# Patient Record
Sex: FEMALE | Race: WHITE | Employment: OTHER | RURAL
[De-identification: names, ages, dates, MRNs, and addresses within clinical notes are randomized per-mention and may not be internally consistent; named-entity substitution may affect disease eponyms.]

---

## 2017-02-13 ENCOUNTER — OFFICE VISIT (OUTPATIENT)
Dept: FAMILY MEDICINE CLINIC | Age: 72
End: 2017-02-13

## 2017-02-13 VITALS
DIASTOLIC BLOOD PRESSURE: 60 MMHG | HEIGHT: 64 IN | BODY MASS INDEX: 31.69 KG/M2 | RESPIRATION RATE: 16 BRPM | SYSTOLIC BLOOD PRESSURE: 138 MMHG | WEIGHT: 185.6 LBS | OXYGEN SATURATION: 100 % | TEMPERATURE: 92.2 F | HEART RATE: 72 BPM

## 2017-02-13 DIAGNOSIS — J06.9 VIRAL UPPER RESPIRATORY TRACT INFECTION: ICD-10-CM

## 2017-02-13 DIAGNOSIS — Z96.653 H/O TOTAL KNEE REPLACEMENT, BILATERAL: ICD-10-CM

## 2017-02-13 DIAGNOSIS — M72.0 DUPUYTREN'S CONTRACTURE OF BOTH HANDS: ICD-10-CM

## 2017-02-13 DIAGNOSIS — K57.30 DIVERTICULOSIS OF LARGE INTESTINE WITHOUT HEMORRHAGE: ICD-10-CM

## 2017-02-13 DIAGNOSIS — M48.061 DEGENERATIVE LUMBAR SPINAL STENOSIS: ICD-10-CM

## 2017-02-13 DIAGNOSIS — K21.9 GASTROESOPHAGEAL REFLUX DISEASE, ESOPHAGITIS PRESENCE NOT SPECIFIED: ICD-10-CM

## 2017-02-13 DIAGNOSIS — N28.9 FUNCTION KIDNEY DECREASED: ICD-10-CM

## 2017-02-13 DIAGNOSIS — I10 ESSENTIAL HYPERTENSION, BENIGN: ICD-10-CM

## 2017-02-13 DIAGNOSIS — Z79.899 LONG TERM USE OF DRUG: ICD-10-CM

## 2017-02-13 DIAGNOSIS — J44.9 CHRONIC OBSTRUCTIVE PULMONARY DISEASE, UNSPECIFIED COPD TYPE (HCC): ICD-10-CM

## 2017-02-13 DIAGNOSIS — R76.8 POSITIVE ANA (ANTINUCLEAR ANTIBODY): ICD-10-CM

## 2017-02-13 DIAGNOSIS — K58.9 IRRITABLE BOWEL SYNDROME, UNSPECIFIED TYPE: ICD-10-CM

## 2017-02-13 DIAGNOSIS — Z92.89 HISTORY OF POSITIVE DOUBLE STRANDED DNA ANTIBODY TEST: ICD-10-CM

## 2017-02-13 DIAGNOSIS — G56.03 CARPAL TUNNEL SYNDROME ON BOTH SIDES: Primary | ICD-10-CM

## 2017-02-13 DIAGNOSIS — M47.816 SPONDYLOSIS OF LUMBAR REGION WITHOUT MYELOPATHY OR RADICULOPATHY: ICD-10-CM

## 2017-02-13 DIAGNOSIS — M79.7 FIBROMYALGIA: ICD-10-CM

## 2017-02-13 RX ORDER — AMITRIPTYLINE HYDROCHLORIDE 25 MG/1
TABLET, FILM COATED ORAL
Qty: 180 TAB | Refills: 3 | Status: SHIPPED | OUTPATIENT
Start: 2017-02-13

## 2017-02-13 RX ORDER — METHYLPREDNISOLONE ACETATE 40 MG/ML
INJECTION, SUSPENSION INTRA-ARTICULAR; INTRALESIONAL; INTRAMUSCULAR; SOFT TISSUE
Qty: 1 ML | Refills: 0
Start: 2017-02-13

## 2017-02-13 RX ORDER — GABAPENTIN 800 MG/1
800 TABLET ORAL 3 TIMES DAILY
Qty: 270 TAB | Refills: 3 | Status: SHIPPED | OUTPATIENT
Start: 2017-02-13

## 2017-02-13 NOTE — PROGRESS NOTES
[address] [facility]  RHEUMATOLOGY  [address] [place], [address] [facility]  Phone [phone] * Fax [phone]    2/13/2017      Chief Complaint   Patient presents with    Follow-up     Care for lumbar spondylosis/stenosis, bilateral carpal tunnel, osteoarthritis, plus an element of fibromyalgia. Recheck DARLENE positivity. REASON FOR VISIT  Emory Severe is a 67 y.o. female who is here for scheduled follow-up for chronic back pain with stenosis and some claudication, bilateral carpal tunnel, right rotator cuff disease, an element of fibromyalgia and monitoring of medications as required. Also for follow-up of a positive DARLENE not thought to be driving her current rheumatic symptoms. She reports stable pain score 5/10 with her back and legs being responsible for the bulk of her discomfort. She has ongoing symptoms of bilateral carpal tunnel and wants the carpal tunnels reinjected today. Symptoms are more prominent on the right hand take longer to clear there when she is symptomatic. She reports that it seems to hurt to make no difference in terms of numbness or CTS symptoms while she is wearing splints or not so she seldom uses them. She has a head cold presently but has had no interval health events and is able to function with her present level of pain. Her kidney function will be rechecked today and I am going to get a follow-up DARLENE profile to check on the status of her positive antinuclear antibody. Renewal prescriptions for gabapentin and amitriptyline will be provided the meloxicam should last her for the rest of the year. She probably will not need to see another rheumatologist if the DARLENE remains noncontributory. Orthopedists will probably need her musculoskeletal needs for the present. 1.          Current Outpatient Prescriptions   Medication Sig Dispense Refill    gabapentin (NEURONTIN) 800 mg tablet Take 1 Tab by mouth three (3) times daily.  270 Tab 3    amitriptyline (ELAVIL) 25 mg tablet Take 2 tabs po hs. 180 Tab 3    meloxicam (MOBIC) 7.5 mg tablet Take 1 Tab by mouth daily. 90 Tab 3    SYMBICORT 160-4.5 mcg/actuation HFA inhaler       COMBIVENT RESPIMAT  mcg/actuation inhaler   1    roflumilast (DALIRESP) tab tablet Take 500 mcg by mouth daily.  albuterol-ipratropium (DUO-NEB) 2.5 mg-0.5 mg/3 ml nebulizer solution 3 mL by Nebulization route once.  spironolactone (ALDACTONE) 25 mg tablet Take  by mouth daily.  ramipril (ALTACE) 10 mg capsule Take 10 mg by mouth daily.  cholecalciferol, vitamin D3, (VITAMIN D3) 2,000 unit tab Take  by mouth.  amLODIPine (NORVASC) 5 mg tablet Take 5 mg by mouth daily.  bumetanide (BUMEX) 1 mg tablet Take  by mouth daily.  montelukast (SINGULAIR) 10 mg tablet Take 10 mg by mouth daily.  Cetirizine (ZYRTEC) 10 mg cap Take  by mouth.  plant stanol parviz (CHOLEST OFF) 450 mg Tab Take  by mouth two (2) times a day.  albuterol (PROAIR HFA) 90 mcg/Actuation inhaler Take 1 Puff by inhalation. Uses for emergency only.  ranitidine (ZANTAC) 150 mg tablet Take 150 mg by mouth two (2) times a day.  ascorbic acid (VITAMIN C) 500 mg tablet Take  by mouth.  ASPIRIN (ASPIR-81 PO) Take  by mouth. Pt takes 5 tabs a week.  carvedilol (COREG) 25 mg tablet Take 25 mg by mouth two (2) times daily (with meals).  pantoprazole (PROTONIX) 40 mg tablet Take 40 mg by mouth daily.  cloNIDine (CATAPRES) 0.1 mg tablet Take 0.1 mg by mouth two (2) times a day. Pt takes a total of 2.5 mg daily. 1.5 mg in the morning and 1 mg in the evening.  potassium chloride SR (KLOR-CON 10) 10 mEq tablet Take 10 mEq by mouth daily.  methylPREDNISolone acetate (DEPO-MEDROL) 40 mg/mL injection Inject the each carpal tunnel intraarticular with 12 mg DepoMedrol.  1 mL 0       Patient Active Problem List   Diagnosis Code    Abdominal pain R10.9    Carpal tunnel syndrome on both sides G56.03    H/O total knee replacement (bilateral) Z96.659    Fibromyalgia M79.7    History of positive double stranded DNA antibody test Z78.9    Dupuytren's contracture of both hands M72.0    COPD (chronic obstructive pulmonary disease) (HCC) J44.9    GERD (gastroesophageal reflux disease) K21.9    Diverticulosis K57.90    H/O polymyalgia rheumatica (doubt) Z87.39    Essential hypertension, benign I10    Edema R60.9    History of heat stroke Z87.828    Need for post exposure prophylaxis for rabies Z20.3    Positive DARLENE (antinuclear antibody) (low titer, not driving disease) O46.8    H/O ulcerative colitis Z87.19    IBS (irritable bowel syndrome) K58.9    H/O resection of large bowel (14\" for diverticulitis) Z90.49    Lumbar spondylosis M47.816    Degenerative lumbar spinal stenosis (multilevel) M48.06    Abdominal adhesions (H/O lysis of) K66.0    H/O bacteremia (E coli) Z87.898    Function kidney decreased N28.9         The medications were reviewed and updated in the medical record. The past medical history, past surgical history, and family history were reviewed and updated in the medical record. REVIEW OF SYSTEMS    Full review of systems including head, ears, eyes, nose, mouth and throat, G.I., , hemolymphatic, neuroendocrine, and other was carried out. The following abnormalities were noted:    1. There is ongoing variable fatigue. 2.  Long-term tendency to dry mouth. 3.  Shortness of breath related to COPD. 4.  Abdominal/GI symptoms related to GERD. 5.  Long-term tendency to easy bruising. 6.  Some intermittent ankle edema.     PHYSICAL EXAMINATION    Visit Vitals    /60 (BP 1 Location: Right arm, BP Patient Position: Sitting)    Pulse 72    Temp (!) 92.2 °F (33.4 °C) (Oral)    Resp 16    Ht 5' 3.5\" (1.613 m)    Wt 185 lb 9.6 oz (84.2 kg)    SpO2 100%    BMI 32.36 kg/m2    Pain Scale: 5/10      Examination of the hands today reveals mild rather subtle diminution of light touch in the index and middle finger of the right hand compared to the ring finger consistent with a more severe carpal tunnel symptoms on that side. There is also some early thenar atrophy on the right. Sensation on the left is normal and any thenar atrophy is equivocal at best.  Surgical scar over her knee is well-healed and there is no ankle edema appreciated this morning. ASSESSMENT AND PLAN    1. Carpal tunnel syndrome on both sides    2. Spondylosis of lumbar region without myelopathy or radiculopathy    3. Degenerative lumbar spinal stenosis (multilevel)    4. Function kidney decreased    5. Fibromyalgia    6. H/O total knee replacement, bilateral    7. Chronic obstructive pulmonary disease, unspecified COPD type ([facility] [address]    8. Positive DARLENE (antinuclear antibody) (low titer, not driving disease)    9. History of positive double stranded DNA antibody test    10. Gastroesophageal reflux disease, esophagitis presence not specified    11. Diverticulosis of large intestine without hemorrhage    12. Essential hypertension, benign    13. Irritable bowel syndrome, unspecified type    14. Dupuytren's contracture of both hands    15. Long term use of drug    16. Viral upper respiratory tract infection      Orders Placed This Encounter    CBC WITH AUTOMATED DIFF    METABOLIC PANEL, COMPREHENSIVE    METHYLPREDNISOLONE ACETATE INJECTION 40 MG     Order Specific Question:   Charge Quantity? Answer:   1     Order Specific Question:   Dose     Answer:   40 mg     Order Specific Question:   Site     Answer:   OTHER     Order Specific Question:   Expiration Date     Answer:   12/31/2017     Order Specific Question:   Lot#     Answer:   [id number]     Order Specific Question:        Answer:   [employer]     Order Specific Question:   Perfomed by/Witnessed by:      Answer:   Howard Horton     Order Specific Question:   NDC#     Answer:   [id number]    MT DRAIN/INJECT INTERMEDIATE JOINT/BURSA     Inject the left carpal tunnel intraarticular with 12 mg DepoMedrol.  [place] DRAIN/INJECT INTERMEDIATE JOINT/BURSA     Inject the right carpal tunnel intraarticular with 12 mg DepoMedrol.  gabapentin (NEURONTIN) 800 mg tablet     Sig: Take 1 Tab by mouth three (3) times daily. Dispense:  270 Tab     Refill:  3    amitriptyline (ELAVIL) 25 mg tablet     Sig: Take 2 tabs po hs. Dispense:  180 Tab     Refill:  3     Ms. Kaycee Newberry requests that this Rx not go through her insurance.  methylPREDNISolone acetate (DEPO-MEDROL) 40 mg/mL injection     Sig: Inject the each carpal tunnel intraarticular with 12 mg DepoMedrol. Dispense:  1 mL     Refill:  0     · DARLENE Reflex profile to [facility]    Rx:  1. Inject each carpal tunnel with 12 mg of Depo-Medrol withdrawn from a single 40 mg vial.  2.  Prescription is for amitriptyline and gabapentin are E prescribed to [facility] in [facility] as per above. 3.  Lab work today to monitor kidney function and medication use. 4.  DARLENE reflex profile to RD L today. 5.  Copy of this note and today's lab as it returns to Dr. Kandi Rodríguez. 6.  This is the patient's final visit with me before my [facility]. Further contact will be on \"will call\" basis. Follow-up Disposition:  Return \"Will call\".     Valentina Peralta MD    (This document has been electronically signed)

## 2017-02-13 NOTE — LETTER
Dr. Tam Owen, 
 
I just wanted to let you know that I have seen Edmond Arteaga. Her findings and recommendations are contained within the current note for your review. Kirsten Otero [facility]  [place] [address] [facility], [address] [facility] Phone [phone] * Fax [phone] 
 
2/13/2017 Chief Complaint Patient presents with  Follow-up Care for lumbar spondylosis/stenosis, bilateral carpal tunnel,  
osteoarthritis, plus an element of fibromyalgia. Recheck DARLENE positivity. REASON FOR VISIT Shanel Oconnor is a 67 y.o. female who is here for scheduled follow-up for  
chronic back pain with stenosis and some claudication, bilateral carpal  
tunnel, right rotator cuff disease, an element of fibromyalgia and  
monitoring of medications as required. Also for follow-up of a positive DARLENE not thought to be driving her current rheumatic symptoms. She reports stable pain score 5/10 with her back and legs being  
responsible for the bulk of her discomfort. She has ongoing symptoms of  
bilateral carpal tunnel and wants the carpal tunnels reinjected today. Symptoms are more prominent on the right hand take longer to clear there  
when she is symptomatic. She reports that it seems to hurt to make no  
difference in terms of numbness or CTS symptoms while she is wearing  
splints or not so she seldom uses them. She has a head cold presently but has had no interval health events and is  
able to function with her present level of pain. Her kidney function will  
be rechecked today and I am going to get a follow-up DARLENE profile to check  
on the status of her positive antinuclear antibody. Renewal prescriptions  
for gabapentin and amitriptyline will be provided the meloxicam should  
last her for the rest of the year. She probably will not need to see another rheumatologist if the DARLENE  
remains noncontributory. Orthopedists will probably need her musculoskeletal needs for the present. 1. 
 
 
 
 
Current Outpatient Prescriptions Medication Sig Dispense Refill  gabapentin (NEURONTIN) 800 mg tablet Take 1 Tab by mouth three (3) times  
daily. 270 Tab 3  
 amitriptyline (ELAVIL) 25 mg tablet Take 2 tabs po hs. 180 Tab 3  
 meloxicam (MOBIC) 7.5 mg tablet Take 1 Tab by mouth daily. 90 Tab 3  
 SYMBICORT 160-4.5 mcg/actuation HFA inhaler  COMBIVENT RESPIMAT  mcg/actuation inhaler   1  
 roflumilast (DALIRESP) tab tablet Take 500 mcg by mouth daily.  albuterol-ipratropium (DUO-NEB) 2.5 mg-0.5 mg/3 ml nebulizer solution 3  
mL by Nebulization route once.  spironolactone (ALDACTONE) 25 mg tablet Take  by mouth daily.  ramipril (ALTACE) 10 mg capsule Take 10 mg by mouth daily.  cholecalciferol, vitamin D3, (VITAMIN D3) 2,000 unit tab Take  by mouth.  amLODIPine (NORVASC) 5 mg tablet Take 5 mg by mouth daily.  bumetanide (BUMEX) 1 mg tablet Take  by mouth daily.  montelukast (SINGULAIR) 10 mg tablet Take 10 mg by mouth daily.  Cetirizine (ZYRTEC) 10 mg cap Take  by mouth.  plant stanol parviz (CHOLEST OFF) 450 mg Tab Take  by mouth two (2) times  
a day.  albuterol (PROAIR HFA) 90 mcg/Actuation inhaler Take 1 Puff by  
inhalation. Uses for emergency only.  ranitidine (ZANTAC) 150 mg tablet Take 150 mg by mouth two (2) times a  
day.  ascorbic acid (VITAMIN C) 500 mg tablet Take  by mouth.  ASPIRIN (ASPIR-81 PO) Take  by mouth. Pt takes 5 tabs a week.  carvedilol (COREG) 25 mg tablet Take 25 mg by mouth two (2) times daily (with meals).  pantoprazole (PROTONIX) 40 mg tablet Take 40 mg by mouth daily.  cloNIDine (CATAPRES) 0.1 mg tablet Take 0.1 mg by mouth two (2) times a  
day. Pt takes a total of 2.5 mg daily. 1.5 mg in the morning and 1 mg in  
the evening.  potassium chloride SR (KLOR-CON 10) 10 mEq tablet Take 10 mEq by mouth  
daily.  methylPREDNISolone acetate (DEPO-MEDROL) 40 mg/mL injection Inject the  
each carpal tunnel intraarticular with 12 mg DepoMedrol. 1 mL 0 Patient Active Problem List  
Diagnosis Code  Abdominal pain R10.9  Carpal tunnel syndrome on both sides G56.03  
 H/O total knee replacement (bilateral) R89.653  Fibromyalgia M79.7  History of positive double stranded DNA antibody test Z78.9  Dupuytren's contracture of both hands M72.0  
 COPD (chronic obstructive pulmonary disease) ([facility] [address] J44.9  GERD (gastroesophageal reflux disease) K21.9  Diverticulosis K57.90  
 H/O polymyalgia rheumatica (doubt) Z87.39  
 Essential hypertension, benign I10  
 Edema R60.9  History of heat stroke Z87.828  Need for post exposure prophylaxis for rabies Z20.3  Positive DARLENE (antinuclear antibody) (low titer, not driving disease) R76.8  
 H/O ulcerative colitis Z87.19  
 IBS (irritable bowel syndrome) K58.9  
 H/O resection of large bowel (14\" for diverticulitis) Z90.49  Lumbar spondylosis M47.816  Degenerative lumbar spinal stenosis (multilevel) M48.06  
 Abdominal adhesions (H/O lysis of) K66.0  
 H/O bacteremia (E coli) Z87.898  Function kidney decreased N28.9 The medications were reviewed and updated in the medical record. The past medical history, past surgical history, and family history were  
reviewed and updated in the medical record. REVIEW OF SYSTEMS Full review of systems including head, ears, eyes, nose, mouth and throat,  
G.I., , hemolymphatic, neuroendocrine, and other was carried out. The  
following abnormalities were noted: 1. There is ongoing variable fatigue. 2.  Long-term tendency to dry mouth. 3.  Shortness of breath related to COPD. 4.  Abdominal/GI symptoms related to GERD. 5.  Long-term tendency to easy bruising. 6.  Some intermittent ankle edema. PHYSICAL EXAMINATION Visit Vitals  /60 (BP 1 Location: Right arm, BP Patient Position: Sitting)  Pulse 72  Temp (!) 92.2 °F (33.4 °C) (Oral)  Resp 16  
 Ht 5' 3.5\" (1.613 m)  Wt 185 lb 9.6 oz (84.2 kg)  SpO2 100%  BMI 32.36 kg/m2 Pain Scale: 5/10 Examination of the hands today reveals mild rather subtle diminution of  
light touch in the index and middle finger of the right hand compared to  
the ring finger consistent with a more severe carpal tunnel symptoms on  
that side. There is also some early thenar atrophy on the right. Sensation on the left is normal and any thenar atrophy is equivocal at  
best.  Surgical scar over her knee is well-healed and there is no ankle  
edema appreciated this morning. ASSESSMENT AND PLAN 1. Carpal tunnel syndrome on both sides 2. Spondylosis of lumbar region without myelopathy or radiculopathy 3. Degenerative lumbar spinal stenosis (multilevel) 4. Function kidney decreased 5. Fibromyalgia 6. H/O total knee replacement, bilateral   
7. Chronic obstructive pulmonary disease, unspecified COPD type ([facility] [address] 8. Positive DARLENE (antinuclear antibody) (low titer, not driving disease) 9. History of positive double stranded DNA antibody test   
10. Gastroesophageal reflux disease, esophagitis presence not specified 11. Diverticulosis of large intestine without hemorrhage 12. Essential hypertension, benign 13. Irritable bowel syndrome, unspecified type 14. Dupuytren's contracture of both hands 15. Long term use of drug Orders Placed This Encounter  CBC WITH AUTOMATED DIFF  
 METABOLIC PANEL, COMPREHENSIVE  
 METHYLPREDNISOLONE ACETATE INJECTION 40 MG Order Specific Question:   Charge Quantity? Answer:   1  [place] DRAIN/INJECT INTERMEDIATE JOINT/BURSA Inject the left carpal tunnel intraarticular with 12 mg DepoMedrol.  [place] DRAIN/INJECT INTERMEDIATE JOINT/BURSA Inject the right carpal tunnel intraarticular with 12 mg DepoMedrol.  gabapentin (NEURONTIN) 800 mg tablet Sig: Take 1 Tab by mouth three (3) times daily. Dispense:  270 Tab Refill:  3  
 amitriptyline (ELAVIL) 25 mg tablet Sig: Take 2 tabs po hs. Dispense:  180 Tab Refill:  3  
  Ms. Paras Esparza requests that this Rx not go through her insurance.  methylPREDNISolone acetate (DEPO-MEDROL) 40 mg/mL injection Sig: Inject the each carpal tunnel intraarticular with 12 mg DepoMedrol. Dispense:  1 mL Refill:  0  
 
· DARLENE Reflex profile to [facility] Rx: 
1. Inject each carpal tunnel with 12 mg of Depo-Medrol withdrawn from a  
single 40 mg vial. 
2.  Prescription is for amitriptyline and gabapentin are E prescribed to [facility] in [place] as per above. 3.  Lab work today to monitor kidney function and medication use. 4.  DARLENE reflex profile to RD L today. 5.  Copy of this note and today's lab as it returns to Dr. Elan Perkins. 6.  This is the patient's final visit with me before my [facility]. Further contact will be on \"will call\" basis. Follow-up Disposition: 
Return \"Will call\". Pamela Greenberg MD 
 
(This document has been electronically signed) Laboratory studies will be forwarded as they are resulted.  
Lloyd Trinidad MD

## 2017-02-13 NOTE — MR AVS SNAPSHOT
Visit Information Date & Time Provider Department Dept. Phone Encounter #  
 2/13/2017  9:30 AM Kellee Tracy, [facility] [id number] Follow-up Instructions Return \"Will call\". Upcoming Health Maintenance Date Due Hepatitis C Screening 1945 DTaP/Tdap/Td series (1 - Tdap) 1/17/1966 BREAST CANCER SCRN MAMMOGRAM 1/17/1995 FOBT Q 1 YEAR AGE 50-75 1/17/1995 ZOSTER VACCINE AGE 60> 1/17/2005 GLAUCOMA SCREENING Q2Y 1/17/2010 OSTEOPOROSIS SCREENING (DEXA) 1/17/2010 Pneumococcal 65+ Low/Medium Risk (1 of 2 - PCV13) 1/17/2010 MEDICARE YEARLY EXAM 1/17/2010 INFLUENZA AGE 9 TO ADULT 8/1/2016 Allergies as of 2/13/2017  Review Complete On: 2/13/2017 By: Jorge Stearns LPN Severity Noted Reaction Type Reactions Ciprofloxacin High 11/30/2011   Systemic Rash Pcn [Penicillins] High 11/30/2011   Systemic Other (comments) Can't remember symptom Sulfa (Sulfonamide Antibiotics) High 11/30/2011   Systemic Itching Levaquin [Levofloxacin] Medium 11/30/2011   Systemic Myalgia Current Immunizations  Never Reviewed No immunizations on file. Not reviewed this visit You Were Diagnosed With   
  
 Codes Comments Carpal tunnel syndrome on both sides    -  Primary ICD-10-CM: G56.03 
ICD-9-CM: 354.0 Spondylosis of lumbar region without myelopathy or radiculopathy     ICD-10-CM: M47.816 ICD-9-CM: 721.3 Degenerative lumbar spinal stenosis     ICD-10-CM: M48.06 
ICD-9-CM: 724.02 Function kidney decreased     ICD-10-CM: N28.9 ICD-9-CM: 593.9 Fibromyalgia     ICD-10-CM: M79.7 ICD-9-CM: 729.1 H/O total knee replacement, bilateral     ICD-10-CM: F93.062 ICD-9-CM: V43.65 Chronic obstructive pulmonary disease, unspecified COPD type ([facility] [address]     ICD-10-CM: J44.9 ICD-9-CM: 695 Positive DARLENE (antinuclear antibody)     ICD-10-CM: R76.8 ICD-9-CM: 795.79   
 History of positive double stranded DNA antibody test     ICD-10-CM: Z78.9 ICD-9-CM: V15.89 Gastroesophageal reflux disease, esophagitis presence not specified     ICD-10-CM: K21.9 ICD-9-CM: 530.81 Diverticulosis of large intestine without hemorrhage     ICD-10-CM: K57.30 ICD-9-CM: 562.10 Essential hypertension, benign     ICD-10-CM: I10 
ICD-9-CM: 401.1 Irritable bowel syndrome, unspecified type     ICD-10-CM: K58.9 ICD-9-CM: 329.9 Dupuytren's contracture of both hands     ICD-10-CM: M72.0 ICD-9-CM: 728.6 Long term use of drug     ICD-10-CM: Z79.899 ICD-9-CM: V58.69 Vitals BP Pulse Temp Resp Height(growth percentile) Weight(growth percentile)  
 138/60 (BP 1 Location: Right arm, BP Patient Position: Sitting) 72 (!) 92.2 °F (33.4 °C) (Oral) 16 5' 3.5\" (1.613 m) 185 lb 9.6 oz (84.2 kg) SpO2 BMI OB Status Smoking Status 100% 32.36 kg/m2 Postmenopausal Former Smoker Vitals History BMI and BSA Data Body Mass Index Body Surface Area  
 32.36 kg/m 2 1.94 m 2 Preferred Pharmacy Pharmacy Name Phone [facility] [address] [place] [address] & [facility] [phone] Your Updated Medication List  
  
   
This list is accurate as of: 2/13/17 10:50 AM.  Always use your most recent med list.  
  
  
  
  
 * albuterol-ipratropium 2.5 mg-0.5 mg/3 ml Nebu Commonly known as:  DUO-NEB  
3 mL by Nebulization route once. * COMBIVENT RESPIMAT  mcg/actuation inhaler Generic drug:  ipratropium-albuterol ALDACTONE 25 mg tablet Generic drug:  spironolactone Take  by mouth daily. amitriptyline 25 mg tablet Commonly known as:  ELAVIL Take 2 tabs po hs. amLODIPine 5 mg tablet Commonly known as:  Horris Bills Take 5 mg by mouth daily. ASPIR-81 PO Take  by mouth. Pt takes 5 tabs a week. bumetanide 1 mg tablet Commonly known as:  Gina Vargas Take  by mouth daily. CHOLEST  mg Tab Generic drug:  plant stanol parviz Take  by mouth two (2) times a day. cloNIDine HCl 0.1 mg tablet Commonly known as:  CATAPRES Take 0.1 mg by mouth two (2) times a day. Pt takes a total of 2.5 mg daily. 1.5 mg in the morning and 1 mg in the evening. COREG 25 mg tablet Generic drug:  carvedilol Take 25 mg by mouth two (2) times daily (with meals). gabapentin 800 mg tablet Commonly known as:  NEURONTIN Take 1 Tab by mouth three (3) times daily. meloxicam 7.5 mg tablet Commonly known as:  MOBIC Take 1 Tab by mouth daily. methylPREDNISolone acetate 40 mg/mL injection Commonly known as:  DEPO-Medrol Inject the each carpal tunnel intraarticular with 12 mg DepoMedrol. potassium chloride SR 10 mEq tablet Commonly known as:  KLOR-CON 10 Take 10 mEq by mouth daily. PROAIR HFA 90 mcg/actuation inhaler Generic drug:  albuterol Take 1 Puff by inhalation. Uses for emergency only. PROTONIX 40 mg tablet Generic drug:  pantoprazole Take 40 mg by mouth daily. ramipril 10 mg capsule Commonly known as:  ALTACE Take 10 mg by mouth daily. roflumilast Tab tablet Commonly known as:  Rosa Maria Arrow Take 500 mcg by mouth daily. SINGULAIR 10 mg tablet Generic drug:  montelukast  
Take 10 mg by mouth daily. SYMBICORT 160-4.5 mcg/actuation HFA inhaler Generic drug:  budesonide-formoterol VITAMIN C 500 mg tablet Generic drug:  ascorbic acid (vitamin C) Take  by mouth. VITAMIN D3 2,000 unit Tab Generic drug:  cholecalciferol (vitamin D3) Take  by mouth. ZANTAC 150 mg tablet Generic drug:  raNITIdine Take 150 mg by mouth two (2) times a day. ZyrTEC 10 mg Cap Generic drug:  Cetirizine Take  by mouth. * Notice: This list has 2 medication(s) that are the same as other medications prescribed for you.  Read the directions carefully, and ask your doctor or other care provider to review them with you. Prescriptions Sent to Pharmacy Refills  
 gabapentin (NEURONTIN) 800 mg tablet 3 Sig: Take 1 Tab by mouth three (3) times daily. Class: Normal  
 Pharmacy: [facility] [address] [facility], [address] Ph #: [phone] Route: Oral  
 amitriptyline (ELAVIL) 25 mg tablet 3 Sig: Take 2 tabs po hs. Class: Normal  
 Pharmacy: [facility] [address] [facility], [address] Ph #: [phone] We Performed the Following CBC WITH AUTOMATED DIFF [14258 CPT(R)] METABOLIC PANEL, COMPREHENSIVE [15432 CPT(R)] METHYLPREDNISOLONE ACETATE INJECTION 40 MG [ [facility]] ID DRAIN/INJECT INTERMEDIATE JOINT/BURSA [id number] CPT(R)] Comments:  
 Inject the left carpal tunnel intraarticular with 12 mg DepoMedrol. ID DRAIN/INJECT INTERMEDIATE JOINT/BURSA [id number] CPT(R)] Comments:  
 Inject the right carpal tunnel intraarticular with 12 mg DepoMedrol. Follow-up Instructions Return \"Will call\". Introducing [facility] [place] [facility]! [facility] introduces [employer]AM-IT patient portal. Now you can access parts of your medical record, email your doctor's office, and request medication refills online. 1. In your internet browser, go to [email or web address] 2. Click on the First Time User? Click Here link in the Sign In box. You will see the New Member Sign Up page. 3. Enter your HAM-IT Access Code exactly as it appears below. You will not need to use this code after youve completed the sign-up process. If you do not sign up before the expiration date, you must request a new code. · HAM-IT Access Code: [email or web address] Expires: 2/19/2017 10:43 AM 
 
4. Enter the last four digits of your Social Security Number (xxxx) and Date of Birth (mm/dd/yyyy) as indicated and click Submit. You will be taken to the next sign-up page. 5. Create a [employer] ID. This will be your [employer] login ID and cannot be changed, so think of one that is secure and easy to remember. 6. Create a [employer] password. You can change your password at any time. 7. Enter your Password Reset Question and Answer. This can be used at a later time if you forget your password. 8. Enter your e-mail address. You will receive e-mail notification when new information is available in [address] 9. Click Sign Up. You can now view and download portions of your medical record. 10. Click the Download Summary menu link to download a portable copy of your medical information. If you have questions, please visit the Frequently Asked Questions section of the [employer] website. Remember, [employer] is NOT to be used for urgent needs. For medical emergencies, dial 911. Now available from your iPhone and Android! Please provide this summary of care documentation to your next provider. Your primary care clinician is listed as Michael Zambrano. If you have any questions after today's visit, please call [phone].

## 2017-02-14 LAB
ALBUMIN SERPL-MCNC: 4.2 G/DL (ref 3.5–4.8)
ALBUMIN/GLOB SERPL: 2.1 {RATIO} (ref 1.1–2.5)
ALP SERPL-CCNC: 91 IU/L (ref 39–117)
ALT SERPL-CCNC: 13 IU/L (ref 0–32)
AST SERPL-CCNC: 19 IU/L (ref 0–40)
BASOPHILS # BLD AUTO: 0.1 X10E3/UL (ref 0–0.2)
BASOPHILS NFR BLD AUTO: 1 %
BILIRUB SERPL-MCNC: 0.3 MG/DL (ref 0–1.2)
BUN SERPL-MCNC: 14 MG/DL (ref 8–27)
BUN/CREAT SERPL: 13 (ref 11–26)
CALCIUM SERPL-MCNC: 9.3 MG/DL (ref 8.7–10.3)
CHLORIDE SERPL-SCNC: 97 MMOL/L (ref 96–106)
CO2 SERPL-SCNC: 23 MMOL/L (ref 18–29)
CREAT SERPL-MCNC: 1.11 MG/DL (ref 0.57–1)
EOSINOPHIL # BLD AUTO: 0.2 X10E3/UL (ref 0–0.4)
EOSINOPHIL NFR BLD AUTO: 3 %
ERYTHROCYTE [DISTWIDTH] IN BLOOD BY AUTOMATED COUNT: 14.6 % (ref 12.3–15.4)
GLOBULIN SER CALC-MCNC: 2 G/DL (ref 1.5–4.5)
GLUCOSE SERPL-MCNC: 94 MG/DL (ref 65–99)
HCT VFR BLD AUTO: 39.3 % (ref 34–46.6)
HGB BLD-MCNC: 12.6 G/DL (ref 11.1–15.9)
IMM GRANULOCYTES # BLD: 0 X10E3/UL (ref 0–0.1)
IMM GRANULOCYTES NFR BLD: 0 %
LYMPHOCYTES # BLD AUTO: 1.6 X10E3/UL (ref 0.7–3.1)
LYMPHOCYTES NFR BLD AUTO: 25 %
MCH RBC QN AUTO: 27 PG (ref 26.6–33)
MCHC RBC AUTO-ENTMCNC: 32.1 G/DL (ref 31.5–35.7)
MCV RBC AUTO: 84 FL (ref 79–97)
MONOCYTES # BLD AUTO: 0.9 X10E3/UL (ref 0.1–0.9)
MONOCYTES NFR BLD AUTO: 13 %
NEUTROPHILS # BLD AUTO: 3.7 X10E3/UL (ref 1.4–7)
NEUTROPHILS NFR BLD AUTO: 58 %
PLATELET # BLD AUTO: 367 X10E3/UL (ref 150–379)
POTASSIUM SERPL-SCNC: 4.6 MMOL/L (ref 3.5–5.2)
PROT SERPL-MCNC: 6.2 G/DL (ref 6–8.5)
RBC # BLD AUTO: 4.67 X10E6/UL (ref 3.77–5.28)
SODIUM SERPL-SCNC: 136 MMOL/L (ref 134–144)
WBC # BLD AUTO: 6.5 X10E3/UL (ref 3.4–10.8)